# Patient Record
Sex: FEMALE | Race: WHITE | NOT HISPANIC OR LATINO | Employment: OTHER | URBAN - METROPOLITAN AREA
[De-identification: names, ages, dates, MRNs, and addresses within clinical notes are randomized per-mention and may not be internally consistent; named-entity substitution may affect disease eponyms.]

---

## 2023-10-21 ENCOUNTER — HOSPITAL ENCOUNTER (EMERGENCY)
Facility: HOSPITAL | Age: 73
Discharge: HOME | End: 2023-10-21
Attending: EMERGENCY MEDICINE
Payer: MEDICARE

## 2023-10-21 VITALS
SYSTOLIC BLOOD PRESSURE: 133 MMHG | HEIGHT: 63 IN | RESPIRATION RATE: 18 BRPM | WEIGHT: 120 LBS | OXYGEN SATURATION: 94 % | DIASTOLIC BLOOD PRESSURE: 71 MMHG | TEMPERATURE: 99 F | HEART RATE: 105 BPM | BODY MASS INDEX: 21.26 KG/M2
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
## 2023-10-21 LAB
ALBUMIN SERPL BCP-MCNC: 4.2 G/DL (ref 3.5–5.2)
ALP SERPL-CCNC: 123 U/L (ref 55–135)
ALT SERPL W/O P-5'-P-CCNC: 14 U/L (ref 10–44)
ANION GAP SERPL CALC-SCNC: 12 MMOL/L (ref 8–16)
AST SERPL-CCNC: 20 U/L (ref 10–40)
BASOPHILS # BLD AUTO: 0.06 K/UL (ref 0–0.2)
BASOPHILS NFR BLD: 0.8 % (ref 0–1.9)
BILIRUB SERPL-MCNC: 0.7 MG/DL (ref 0.1–1)
BUN SERPL-MCNC: 12 MG/DL (ref 8–23)
CALCIUM SERPL-MCNC: 9.9 MG/DL (ref 8.7–10.5)
CHLORIDE SERPL-SCNC: 103 MMOL/L (ref 95–110)
CO2 SERPL-SCNC: 24 MMOL/L (ref 23–29)
CREAT SERPL-MCNC: 0.7 MG/DL (ref 0.5–1.4)
DIFFERENTIAL METHOD: ABNORMAL
EOSINOPHIL # BLD AUTO: 0.2 K/UL (ref 0–0.5)
EOSINOPHIL NFR BLD: 2.8 % (ref 0–8)
ERYTHROCYTE [DISTWIDTH] IN BLOOD BY AUTOMATED COUNT: 13 % (ref 11.5–14.5)
EST. GFR  (NO RACE VARIABLE): >60 ML/MIN/1.73 M^2
GLUCOSE SERPL-MCNC: 97 MG/DL (ref 70–110)
HCT VFR BLD AUTO: 44.1 % (ref 37–48.5)
HGB BLD-MCNC: 14.6 G/DL (ref 12–16)
IMM GRANULOCYTES # BLD AUTO: 0.01 K/UL (ref 0–0.04)
IMM GRANULOCYTES NFR BLD AUTO: 0.1 % (ref 0–0.5)
LYMPHOCYTES # BLD AUTO: 2.2 K/UL (ref 1–4.8)
LYMPHOCYTES NFR BLD: 29 % (ref 18–48)
MCH RBC QN AUTO: 32.7 PG (ref 27–31)
MCHC RBC AUTO-ENTMCNC: 33.1 G/DL (ref 32–36)
MCV RBC AUTO: 99 FL (ref 82–98)
MONOCYTES # BLD AUTO: 0.6 K/UL (ref 0.3–1)
MONOCYTES NFR BLD: 7.8 % (ref 4–15)
NEUTROPHILS # BLD AUTO: 4.6 K/UL (ref 1.8–7.7)
NEUTROPHILS NFR BLD: 59.5 % (ref 38–73)
NRBC BLD-RTO: 0 /100 WBC
PLATELET # BLD AUTO: 208 K/UL (ref 150–450)
PMV BLD AUTO: 11.2 FL (ref 9.2–12.9)
POTASSIUM SERPL-SCNC: 4.7 MMOL/L (ref 3.5–5.1)
PROT SERPL-MCNC: 7.6 G/DL (ref 6–8.4)
RBC # BLD AUTO: 4.47 M/UL (ref 4–5.4)
SODIUM SERPL-SCNC: 139 MMOL/L (ref 136–145)
WBC # BLD AUTO: 7.73 K/UL (ref 3.9–12.7)

## 2023-10-21 PROCEDURE — 99284 EMERGENCY DEPT VISIT MOD MDM: CPT

## 2023-10-21 PROCEDURE — 80053 COMPREHEN METABOLIC PANEL: CPT | Performed by: EMERGENCY MEDICINE

## 2023-10-21 PROCEDURE — 85025 COMPLETE CBC W/AUTO DIFF WBC: CPT | Performed by: EMERGENCY MEDICINE

## 2023-10-21 PROCEDURE — 96372 THER/PROPH/DIAG INJ SC/IM: CPT | Performed by: EMERGENCY MEDICINE

## 2023-10-21 PROCEDURE — 90715 TDAP VACCINE 7 YRS/> IM: CPT | Performed by: EMERGENCY MEDICINE

## 2023-10-21 PROCEDURE — 63600175 PHARM REV CODE 636 W HCPCS: Performed by: EMERGENCY MEDICINE

## 2023-10-21 PROCEDURE — 90471 IMMUNIZATION ADMIN: CPT | Performed by: EMERGENCY MEDICINE

## 2023-10-21 RX ORDER — CEFTRIAXONE 1 G/1
0.5 INJECTION, POWDER, FOR SOLUTION INTRAMUSCULAR; INTRAVENOUS
Status: COMPLETED | OUTPATIENT
Start: 2023-10-21 | End: 2023-10-21

## 2023-10-21 RX ORDER — AMOXICILLIN AND CLAVULANATE POTASSIUM 875; 125 MG/1; MG/1
1 TABLET, FILM COATED ORAL 2 TIMES DAILY
Qty: 14 TABLET | Refills: 0 | Status: SHIPPED | OUTPATIENT
Start: 2023-10-21

## 2023-10-21 RX ORDER — CEPHALEXIN 250 MG/1
250 CAPSULE ORAL 3 TIMES DAILY
Qty: 21 CAPSULE | Refills: 0 | Status: SHIPPED | OUTPATIENT
Start: 2023-10-21 | End: 2023-10-21 | Stop reason: ALTCHOICE

## 2023-10-21 RX ADMIN — TETANUS TOXOID, REDUCED DIPHTHERIA TOXOID AND ACELLULAR PERTUSSIS VACCINE, ADSORBED 0.5 ML: 5; 2.5; 8; 8; 2.5 SUSPENSION INTRAMUSCULAR at 12:10

## 2023-10-21 RX ADMIN — CEFTRIAXONE 0.5 G: 1 INJECTION, POWDER, FOR SOLUTION INTRAMUSCULAR; INTRAVENOUS at 01:10

## 2023-10-21 NOTE — DISCHARGE INSTRUCTIONS
Continue with wound care at home.  Antibiotic ointment once or twice daily, cover wounds with a loose dressing and change frequently.  Return here for any worsening signs or symptoms.

## 2023-10-21 NOTE — ED PROVIDER NOTES
Encounter Date: 10/21/2023       History     Chief Complaint   Patient presents with    Animal Bite     Pt presents with dog bites to BLE onset 10/12/23. Pt states she was attacked by her neighbor's pit bull and the police were notified and a report was made. Swelling, discoloration and wounds to BLE.      73-year-old female here home for evaluation and treatment of dog bites on both calves.  The bites occurred on 10/12.  It was her neighbor's dog.  Patient is unsure about the dog's vaccinations but she states that police were notified in the dog is currently in quarantine.  Patient states she will need a tetanus shot.  She states she was trying to manage her wounds at home, but she has developed some swelling and drainage from the wounds and she is concerned about infection.  Denies any fevers or chills.      Review of patient's allergies indicates:   Allergen Reactions    Codeine      No past medical history on file.  No past surgical history on file.  No family history on file.     Review of Systems   Constitutional: Negative.  Negative for chills and fever.   HENT: Negative.     Eyes: Negative.    Respiratory: Negative.     Cardiovascular:  Positive for leg swelling.   Gastrointestinal: Negative.    Endocrine: Negative.    Genitourinary: Negative.    Musculoskeletal: Negative.  Negative for joint swelling.   Skin:  Positive for wound.   Allergic/Immunologic: Negative.    Neurological: Negative.    Psychiatric/Behavioral: Negative.         Physical Exam     Initial Vitals [10/21/23 1102]   BP Pulse Resp Temp SpO2   133/71 (!) 111 18 98.5 °F (36.9 °C) (!) 93 %      MAP       --         Physical Exam    Nursing note and vitals reviewed.  Constitutional: She appears well-developed and well-nourished. She is not diaphoretic. No distress.   HENT:   Head: Normocephalic and atraumatic.   Nose: Nose normal.   Mouth/Throat: Oropharynx is clear and moist. No oropharyngeal exudate.   Eyes: Conjunctivae and EOM are normal.  Pupils are equal, round, and reactive to light. No scleral icterus.   Neck: Neck supple. No thyromegaly present. No JVD present.   Normal range of motion.  Cardiovascular:  Normal rate, regular rhythm, normal heart sounds and intact distal pulses.           No murmur heard.  Pulmonary/Chest: Breath sounds normal. No stridor. No respiratory distress. She has no wheezes.   Abdominal: Abdomen is soft. Bowel sounds are normal. She exhibits no distension.   Musculoskeletal:         General: Tenderness and edema present. Normal range of motion.      Cervical back: Normal range of motion and neck supple.      Comments: Of each calf, there are wounds consistent with dog bite.  On the right calf, the wounds are less severe, but there is a subcutaneous hematoma which is tender.  No obvious drainage from this area.  Calf muscle belly is soft.  On the left calf, actually more lateral, there is a 1 cm wound which is draining serosanguineous material.  No purulent material.  No fluctuant pockets palpated.  Mild surrounding erythema.  There is bruising around both ankles which appears to have migrated from above, and there is mild dependent edema.  No palpable cords, negative Homans.     Neurological: She is alert and oriented to person, place, and time. She has normal strength. No cranial nerve deficit or sensory deficit. GCS score is 15. GCS eye subscore is 4. GCS verbal subscore is 5. GCS motor subscore is 6.   Skin: Skin is warm and dry. Capillary refill takes less than 2 seconds. No rash noted. No erythema.   Psychiatric: She has a normal mood and affect. Her behavior is normal.         ED Course   Procedures  Labs Reviewed   CBC W/ AUTO DIFFERENTIAL - Abnormal; Notable for the following components:       Result Value    MCV 99 (*)     MCH 32.7 (*)     All other components within normal limits    Narrative:     Recoll. [id number] by [facility] at 10/21/2023 12:12, reason: Specimen   clotted 10/21/2023  12:11 notified ER  "  COMPREHENSIVE METABOLIC PANEL          Imaging Results    None          Medications   Tdap (BOOSTRIX) vaccine injection 0.5 mL (0.5 mLs Intramuscular Given 10/21/23 1204)   cefTRIAXone injection 0.5 g (0.5 g Intramuscular Given 10/21/23 1323)     Medical Decision Making  Differential includes cellulitis, abscess, hematoma, dependent edema, dependent bruising, etc.    There is no obvious abscess.  There is some erythematous skin surrounding the wound on the left leg.  There is no drainage.  Lab results are normal.  No elevation of the white blood cell count.  Patient has no fever.  As precaution, will place the patient on Augmentin.  Patient thinks that she needs "a shot" to get things started.  Will give 1/2 g Rocephin here.  We discussed rabies treatment, but patient declined.  Patient will continue wound care at home, and will return here as needed or if worse in any way.    Amount and/or Complexity of Data Reviewed  Labs: ordered.    Risk  Prescription drug management.                               Clinical Impression:   Final diagnoses:  [W54.0XXA] Dog bite, initial encounter (Primary)        ED Disposition Condition    Discharge Stable          ED Prescriptions       Medication Sig Dispense Start Date End Date Auth. Provider    cephALEXin (KEFLEX) 250 MG capsule  (Status: Discontinued) Take 1 capsule (250 mg total) by mouth 3 (three) times daily. for 7 days 21 capsule 10/21/2023 10/21/2023 Octavio Walton MD    amoxicillin-clavulanate 875-125mg (AUGMENTIN) 875-125 mg per tablet Take 1 tablet by mouth 2 (two) times daily. 14 tablet 10/21/2023 -- Octavio Walton MD          Follow-up Information       Follow up With Specialties Details Why Contact [facility] Emergency Dept Emergency Medicine  As needed, If symptoms worsen [address] [place] [address] [phone]             Octavio Watlon MD  10/21/23 1318       Octavio Walton MD  10/21/23 1324    "